# Patient Record
Sex: FEMALE | Race: WHITE | Employment: OTHER | URBAN - METROPOLITAN AREA
[De-identification: names, ages, dates, MRNs, and addresses within clinical notes are randomized per-mention and may not be internally consistent; named-entity substitution may affect disease eponyms.]

---

## 2019-04-17 ENCOUNTER — OFFICE VISIT (OUTPATIENT)
Dept: OBGYN CLINIC | Age: 34
End: 2019-04-17

## 2019-04-17 VITALS
WEIGHT: 113.4 LBS | HEIGHT: 64 IN | BODY MASS INDEX: 19.36 KG/M2 | DIASTOLIC BLOOD PRESSURE: 70 MMHG | SYSTOLIC BLOOD PRESSURE: 112 MMHG

## 2019-04-17 DIAGNOSIS — Z11.51 SPECIAL SCREENING EXAMINATION FOR HUMAN PAPILLOMAVIRUS (HPV): ICD-10-CM

## 2019-04-17 DIAGNOSIS — Z01.419 WELL WOMAN EXAM: Primary | ICD-10-CM

## 2019-04-17 DIAGNOSIS — N97.9 INFERTILITY, FEMALE: ICD-10-CM

## 2019-04-17 NOTE — PROGRESS NOTES
Annual exam 
 
Jesse Lim is a 30 y.o. presenting for annual exam.  
Her main concerns today include no conception in 4 years since stopping OCP's. Partner is 27 and not fathered any other children. TAB! Ob/Gyn Hx: 
G0 Patient's last menstrual period was 04/12/2019. Menarche- age 18 Menses- regular monthly cycles? yes, last 5 days, passage of clots? no, intermenstrual spotting? no, Number of pads/tampons per day? unsure Contraception-none, withdrawal methold STI- denies SA- yes Health maintenance: 
Pap- 2 years ago, normal per patient, no history of abnormal 
Mammo- not indicated Colonoscopy- not indicated Gardasil- denies Past Medical History:  
Diagnosis Date  Acne rosacea 2/2/2015  ADD (attention deficit disorder)  Family history of skin cancer 2/2/2015  Hypoglycemia Past Surgical History:  
Procedure Laterality Date  HX APPENDECTOMY  HX TONSILLECTOMY Family History Problem Relation Age of Onset  Cancer Mother   
     melanoma /preventative ovary removal.   
 Cancer Maternal Grandmother   
     ovarian (and great grandmother)  Ovarian Cancer Maternal Grandmother  Diabetes Paternal Grandmother  Stroke Maternal Grandfather   
     older, smoker  Heart Disease Maternal Grandfather Social History Socioeconomic History  Marital status: SINGLE Spouse name: Not on file  Number of children: Not on file  Years of education: Not on file  Highest education level: Not on file Occupational History  Not on file Social Needs  Financial resource strain: Not on file  Food insecurity:  
  Worry: Not on file Inability: Not on file  Transportation needs:  
  Medical: Not on file Non-medical: Not on file Tobacco Use  Smoking status: Former Smoker  Smokeless tobacco: Never Used Substance and Sexual Activity  Alcohol use: Yes Comment: socially  Drug use: Yes Types: Marijuana  Sexual activity: Yes  
  Partners: Male Birth control/protection: None Lifestyle  Physical activity:  
  Days per week: Not on file Minutes per session: Not on file  Stress: Not on file Relationships  Social connections:  
  Talks on phone: Not on file Gets together: Not on file Attends Yazdanism service: Not on file Active member of club or organization: Not on file Attends meetings of clubs or organizations: Not on file Relationship status: Not on file  Intimate partner violence:  
  Fear of current or ex partner: Not on file Emotionally abused: Not on file Physically abused: Not on file Forced sexual activity: Not on file Other Topics Concern  Not on file Social History Narrative  Not on file Current Outpatient Medications Medication Sig Dispense Refill  doxycycline monohydrate (ORACEA PO) Take  by mouth.  methylphenidate HCl (CONCERTA PO) Take  by mouth.  tretinoin (RETIN-A) 0.1 % topical cream Apply  to affected area nightly. 20 g 0  
 norethindrone-ethinyl estradiol (ORTHO-NOVUM 1-35 TAB, NORTREL 1-35 TAB) 1-35 mg-mcg per tablet Take 1 Tab by mouth daily. Allergies Allergen Reactions  Codeine Hives Review of Systems - History obtained from the patient Constitutional: negative for weight loss, fever, night sweats HEENT: negative for hearing loss, earache, congestion, snoring, sorethroat CV: negative for chest pain, palpitations, edema Resp: negative for cough, shortness of breath, wheezing GI: negative for change in bowel habits, abdominal pain, black or bloody stools : negative for frequency, dysuria, hematuria, vaginal discharge MSK: negative for back pain, joint pain, muscle pain Breast: negative for breast lumps, nipple discharge, galactorrhea Skin :negative for itching, rash, hives Neuro: negative for dizziness, headache, confusion, weakness Psych: negative for anxiety, depression, change in mood Heme/lymph: negative for bleeding, bruising, pallor Physical Exam 
 
Visit Vitals /70 (BP 1 Location: Left arm, BP Patient Position: Sitting) Ht 5' 4\" (1.626 m) Wt 113 lb 6.4 oz (51.4 kg) LMP 04/12/2019 BMI 19.47 kg/m² Constitutional 
· Appearance: well-nourished, well developed, alert, in no acute distress HENT 
· Head and Face: appears normal 
 
Neck · Inspection/Palpation: normal appearance, no masses or tenderness · Lymph Nodes: no lymphadenopathy present · Thyroid: gland size normal, nontender, no nodules or masses present on palpation Chest 
· Respiratory Effort: non-labored breathing · Auscultation: CTAB, normal breath sounds Cardiovascular · Heart: 
· Auscultation: regular rate and rhythm without murmur · Extremities: no peripheral edema Breasts · Inspection of Breasts: breasts symmetrical, no skin changes, no discharge present, nipple appearance normal, no skin retraction present · Palpation of Breasts and Axillae: no masses present on palpation, no breast tenderness · Axillary Lymph Nodes: no lymphadenopathy present Gastrointestinal 
· Abdominal Examination: abdomen non-tender to palpation, normal bowel sounds, no masses present · Liver and spleen: no hepatomegaly present, spleen not palpable · Hernias: no hernias identified Genitourinary · External Genitalia: normal appearance for age, no discharge present, no tenderness present, no inflammatory lesions present, no masses present, no atrophy present · Vagina: normal vaginal vault without central or paravaginal defects, no discharge present, no inflammatory lesions present, no masses present · Bladder: non-tender to palpation · Urethra: appears normal 
· Cervix: normal  
· Uterus: normal size, shape and consistency · Adnexa: no adnexal tenderness present, no adnexal masses present · Perineum: perineum within normal limits, no evidence of trauma, no rashes or skin lesions present Skin · General Inspection: no rash, no lesions identified Neurologic/Psychiatric · Mental Status: · Orientation: grossly oriented to person, place and time · Mood and Affect: mood normal, affect appropriate Assessment/Plan: 
30 y.o. overall doing well. Health Maintenance: 
-counseled re: diet, exercise, healthy lifestyle 
-pap/HPV sent 
-STI testing  DECLINED Current Problem: 
-Dicussed HSG, Semen anal, Day 3 labs, and OPKs as a start for infert workup Start PNV 
 
RTC: 1 year for annual wellness assessment, or sooner prn for problems or concerns 
-handouts and instructions provided Signed By: Che Hernadez MD   
 April 17, 2019

## 2019-04-17 NOTE — PATIENT INSTRUCTIONS

## 2019-04-22 LAB
CYTOLOGIST CVX/VAG CYTO: NORMAL
CYTOLOGY CVX/VAG DOC THIN PREP: NORMAL
DX ICD CODE: NORMAL
HPV I/H RISK 1 DNA CVX QL PROBE+SIG AMP: NORMAL
HPV I/H RISK 4 DNA CVX QL PROBE+SIG AMP: NEGATIVE
Lab: NORMAL
Lab: NORMAL
OTHER STN SPEC: NORMAL
PATH REPORT.FINAL DX SPEC: NORMAL
STAT OF ADQ CVX/VAG CYTO-IMP: NORMAL

## 2019-12-12 ENCOUNTER — TELEPHONE (OUTPATIENT)
Dept: OBGYN CLINIC | Age: 34
End: 2019-12-12

## 2019-12-12 NOTE — TELEPHONE ENCOUNTER
This nurse attempted to reach the patient and left detailed message for the patient to call the office back.

## 2019-12-12 NOTE — TELEPHONE ENCOUNTER
Message left [email or web address] [id number]    30year old patient last seen in the office on 4/17/19. Patient calling to say she had an order for HSG and never had the procedure done and was advised of needing a new order. ?  Please advise